# Patient Record
(demographics unavailable — no encounter records)

---

## 2025-01-08 NOTE — PLAN
[FreeTextEntry1] : Normal Annual Possible BV Pap/hpv done Mammo Vaginitis Panel I also recommend screening colonoscopy.

## 2025-01-08 NOTE — PHYSICAL EXAM
[Chaperone Present] : A chaperone was present in the examining room during all aspects of the physical examination [[id number]] : A chaperone was present during the pelvic exam. [FreeTextEntry2] : Leyda [Appropriately responsive] : appropriately responsive [Alert] : alert [No Acute Distress] : no acute distress [Soft] : soft [Non-tender] : non-tender [Non-distended] : non-distended [No HSM] : No HSM [No Lesions] : no lesions [No Mass] : no mass [Oriented x3] : oriented x3 [Examination Of The Breasts] : a normal appearance [No Masses] : no breast masses were palpable [Labia Majora] : normal [Labia Minora] : normal [Normal] : normal [Uterine Adnexae] : normal

## 2025-01-08 NOTE — PHYSICAL EXAM
[Chaperone Present] : A chaperone was present in the examining room during all aspects of the physical examination [[id number]] : A chaperone was present during the pelvic exam. [FreeTextEntry2] : Leyda [Appropriately responsive] : appropriately responsive [Alert] : alert [No Acute Distress] : no acute distress [Soft] : soft [Non-tender] : non-tender [Non-distended] : non-distended [No HSM] : No HSM [No Lesions] : no lesions [No Mass] : no mass [Oriented x3] : oriented x3 [Examination Of The Breasts] : a normal appearance [No Masses] : no breast masses were palpable [Labia Majora] : normal [Labia Minora] : normal [Normal] : normal [Uterine Adnexae] : normal

## 2025-01-16 NOTE — REASON FOR VISIT
[Home] : at home, [unfilled] , at the time of the visit. [Medical Office: ([facility])___] : at the medical office located in  [Patient] : the patient [Follow-Up] : a follow-up evaluation of

## 2025-01-16 NOTE — HISTORY OF PRESENT ILLNESS
[FreeTextEntry1] : Patient here to discuss results and further management She is feeling better after I prescribed the Metrogel. The symptoms are now gone. The vagintis panel was actually negative for BV.
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
## 2025-07-16 NOTE — ASSESSMENT
[FreeTextEntry1] : This is a 57-year-old female who presents for neurosurgical revisit with regards to acute on chronic cervical and lumbar radiculopathy affecting the left upper and left lower extremity.  Since our last encounter in May of 2025 she has attempted outpatient physical therapy which failed to provide relief or functional gain.  Patient is to undergo prompt MR C-spine as well as MRI L-spine without contrast.  Additionally, patient would benefit from EMG testing involving the upper/lower extremities.  Goal of such testing is to assess radiculopathy versus inherent neuropathy leading to her presenting complaints.  She is to return to the office in 6 weeks and is to contact us with any concerns in the interim.  She is to remain under the care of pain management for symptom control.  VANITA Burrows MD
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
